# Patient Record
Sex: MALE | Race: WHITE | URBAN - METROPOLITAN AREA
[De-identification: names, ages, dates, MRNs, and addresses within clinical notes are randomized per-mention and may not be internally consistent; named-entity substitution may affect disease eponyms.]

---

## 2024-05-17 ENCOUNTER — OCCMED (OUTPATIENT)
Dept: URGENT CARE | Facility: CLINIC | Age: 59
End: 2024-05-17

## 2024-05-17 DIAGNOSIS — Z02.83 ENCOUNTER FOR DRUG SCREENING: Primary | ICD-10-CM

## 2024-08-06 ENCOUNTER — OCCMED (OUTPATIENT)
Dept: URGENT CARE | Facility: CLINIC | Age: 59
End: 2024-08-06

## 2024-08-06 DIAGNOSIS — Z02.83 ENCOUNTER FOR DRUG SCREENING: Primary | ICD-10-CM

## 2024-12-08 ENCOUNTER — APPOINTMENT (OUTPATIENT)
Dept: RADIOLOGY | Facility: CLINIC | Age: 59
End: 2024-12-08
Payer: OTHER MISCELLANEOUS

## 2024-12-08 ENCOUNTER — OCCMED (OUTPATIENT)
Dept: URGENT CARE | Facility: CLINIC | Age: 59
End: 2024-12-08
Payer: OTHER MISCELLANEOUS

## 2024-12-08 DIAGNOSIS — M25.512 ACUTE PAIN OF LEFT SHOULDER: Primary | ICD-10-CM

## 2024-12-08 DIAGNOSIS — M25.512 ACUTE PAIN OF LEFT SHOULDER: ICD-10-CM

## 2024-12-08 PROCEDURE — 99285 EMERGENCY DEPT VISIT HI MDM: CPT | Performed by: NURSE PRACTITIONER

## 2024-12-08 PROCEDURE — 73060 X-RAY EXAM OF HUMERUS: CPT

## 2024-12-08 PROCEDURE — 73030 X-RAY EXAM OF SHOULDER: CPT

## 2024-12-08 PROCEDURE — G0384 LEV 5 HOSP TYPE B ED VISIT: HCPCS | Performed by: NURSE PRACTITIONER

## 2024-12-08 RX ORDER — HYDROXYZINE HYDROCHLORIDE 10 MG/1
10 TABLET, FILM COATED ORAL
COMMUNITY
Start: 2024-11-11

## 2024-12-08 RX ORDER — ESCITALOPRAM OXALATE 10 MG/1
10 TABLET ORAL DAILY
COMMUNITY
Start: 2024-10-16

## 2024-12-11 ENCOUNTER — OCCMED (OUTPATIENT)
Dept: URGENT CARE | Facility: CLINIC | Age: 59
End: 2024-12-11
Payer: OTHER MISCELLANEOUS

## 2024-12-11 DIAGNOSIS — Z04.2 ENCOUNTER FOR EXAMINATION OR OBSERVATION FOLLOWING WORK ACCIDENT: Primary | ICD-10-CM

## 2024-12-11 PROCEDURE — 99213 OFFICE O/P EST LOW 20 MIN: CPT | Performed by: PHYSICIAN ASSISTANT

## 2024-12-13 ENCOUNTER — TELEPHONE (OUTPATIENT)
Dept: URGENT CARE | Facility: CLINIC | Age: 59
End: 2024-12-13

## 2024-12-13 DIAGNOSIS — M25.512 ACUTE PAIN OF LEFT SHOULDER: Primary | ICD-10-CM

## 2024-12-13 RX ORDER — TRAMADOL HYDROCHLORIDE 50 MG/1
50 TABLET ORAL EVERY 6 HOURS PRN
Qty: 30 TABLET | Refills: 0 | Status: SHIPPED | OUTPATIENT
Start: 2024-12-13

## 2024-12-13 NOTE — TELEPHONE ENCOUNTER
Pt called stating pharmacy wouldn't accept occ med Rx without real signature.  Requesting sent electronically to pharmacy.  Sent to [place] pharmacy, [facility]

## 2024-12-17 ENCOUNTER — APPOINTMENT (OUTPATIENT)
Age: 59
End: 2024-12-17
Payer: OTHER MISCELLANEOUS

## 2024-12-17 PROCEDURE — 99213 OFFICE O/P EST LOW 20 MIN: CPT | Performed by: PHYSICIAN ASSISTANT

## 2024-12-24 ENCOUNTER — APPOINTMENT (OUTPATIENT)
Age: 59
End: 2024-12-24
Payer: OTHER MISCELLANEOUS

## 2024-12-24 DIAGNOSIS — S46.212A TRAUMATIC PARTIAL TEAR OF LEFT BICEPS TENDON, INITIAL ENCOUNTER: ICD-10-CM

## 2024-12-24 DIAGNOSIS — S46.012A TRAUMATIC ROTATOR CUFF TEAR, LEFT, INITIAL ENCOUNTER: Primary | ICD-10-CM

## 2024-12-24 PROCEDURE — 99214 OFFICE O/P EST MOD 30 MIN: CPT | Performed by: PHYSICIAN ASSISTANT

## 2025-01-02 ENCOUNTER — OFFICE VISIT (OUTPATIENT)
Age: 60
End: 2025-01-02
Payer: OTHER MISCELLANEOUS

## 2025-01-02 ENCOUNTER — APPOINTMENT (OUTPATIENT)
Age: 60
End: 2025-01-02
Payer: COMMERCIAL

## 2025-01-02 VITALS — HEIGHT: 70 IN | BODY MASS INDEX: 35.07 KG/M2 | WEIGHT: 245 LBS

## 2025-01-02 DIAGNOSIS — S46.012A TRAUMATIC COMPLETE TEAR OF LEFT ROTATOR CUFF, INITIAL ENCOUNTER: ICD-10-CM

## 2025-01-02 DIAGNOSIS — M79.605 LEFT LEG PAIN: ICD-10-CM

## 2025-01-02 DIAGNOSIS — M25.562 ACUTE PAIN OF LEFT KNEE: ICD-10-CM

## 2025-01-02 DIAGNOSIS — M79.605 LEFT LEG PAIN: Primary | ICD-10-CM

## 2025-01-02 LAB
ALBUMIN SERPL BCG-MCNC: 4.3 G/DL (ref 3.5–5)
ALP SERPL-CCNC: 62 U/L (ref 34–104)
ALT SERPL W P-5'-P-CCNC: 15 U/L (ref 7–52)
ANION GAP SERPL CALCULATED.3IONS-SCNC: 6 MMOL/L (ref 4–13)
AST SERPL W P-5'-P-CCNC: 14 U/L (ref 13–39)
BASOPHILS # BLD AUTO: 0.04 THOUSANDS/ΜL (ref 0–0.1)
BASOPHILS NFR BLD AUTO: 1 % (ref 0–1)
BILIRUB SERPL-MCNC: 0.51 MG/DL (ref 0.2–1)
BUN SERPL-MCNC: 13 MG/DL (ref 5–25)
CALCIUM SERPL-MCNC: 9.3 MG/DL (ref 8.4–10.2)
CHLORIDE SERPL-SCNC: 104 MMOL/L (ref 96–108)
CO2 SERPL-SCNC: 28 MMOL/L (ref 21–32)
CREAT SERPL-MCNC: 0.87 MG/DL (ref 0.6–1.3)
EOSINOPHIL # BLD AUTO: 0.05 THOUSAND/ΜL (ref 0–0.61)
EOSINOPHIL NFR BLD AUTO: 1 % (ref 0–6)
ERYTHROCYTE [DISTWIDTH] IN BLOOD BY AUTOMATED COUNT: 13.8 % (ref 11.6–15.1)
GFR SERPL CREATININE-BSD FRML MDRD: 94 ML/MIN/1.73SQ M
GLUCOSE P FAST SERPL-MCNC: 73 MG/DL (ref 65–99)
HCT VFR BLD AUTO: 47.4 % (ref 36.5–49.3)
HGB BLD-MCNC: 15.3 G/DL (ref 12–17)
IMM GRANULOCYTES # BLD AUTO: 0.01 THOUSAND/UL (ref 0–0.2)
IMM GRANULOCYTES NFR BLD AUTO: 0 % (ref 0–2)
LYMPHOCYTES # BLD AUTO: 2.63 THOUSANDS/ΜL (ref 0.6–4.47)
LYMPHOCYTES NFR BLD AUTO: 36 % (ref 14–44)
MCH RBC QN AUTO: 29 PG (ref 26.8–34.3)
MCHC RBC AUTO-ENTMCNC: 32.3 G/DL (ref 31.4–37.4)
MCV RBC AUTO: 90 FL (ref 82–98)
MONOCYTES # BLD AUTO: 0.41 THOUSAND/ΜL (ref 0.17–1.22)
MONOCYTES NFR BLD AUTO: 6 % (ref 4–12)
NEUTROPHILS # BLD AUTO: 4.26 THOUSANDS/ΜL (ref 1.85–7.62)
NEUTS SEG NFR BLD AUTO: 56 % (ref 43–75)
NRBC BLD AUTO-RTO: 0 /100 WBCS
PLATELET # BLD AUTO: 247 THOUSANDS/UL (ref 149–390)
PMV BLD AUTO: 9.4 FL (ref 8.9–12.7)
POTASSIUM SERPL-SCNC: 4.1 MMOL/L (ref 3.5–5.3)
PROT SERPL-MCNC: 6.9 G/DL (ref 6.4–8.4)
RBC # BLD AUTO: 5.27 MILLION/UL (ref 3.88–5.62)
SODIUM SERPL-SCNC: 138 MMOL/L (ref 135–147)
WBC # BLD AUTO: 7.4 THOUSAND/UL (ref 4.31–10.16)

## 2025-01-02 PROCEDURE — 80053 COMPREHEN METABOLIC PANEL: CPT

## 2025-01-02 PROCEDURE — 99245 OFF/OP CONSLTJ NEW/EST HI 55: CPT | Performed by: ORTHOPAEDIC SURGERY

## 2025-01-02 PROCEDURE — 85025 COMPLETE CBC W/AUTO DIFF WBC: CPT

## 2025-01-02 PROCEDURE — 73562 X-RAY EXAM OF KNEE 3: CPT

## 2025-01-02 PROCEDURE — 36415 COLL VENOUS BLD VENIPUNCTURE: CPT

## 2025-01-02 PROCEDURE — 73590 X-RAY EXAM OF LOWER LEG: CPT

## 2025-01-02 RX ORDER — CHLORHEXIDINE GLUCONATE ORAL RINSE 1.2 MG/ML
15 SOLUTION DENTAL ONCE
OUTPATIENT
Start: 2025-01-02 | End: 2025-01-02

## 2025-01-02 NOTE — H&P (VIEW-ONLY)
CHIEF COMPLAIN/REASON FOR VISIT  Chief Complaint   Patient presents with    Left Upper Arm - Pain       HISTORY OF PRESENT ILLNESS  Jackson Aquino is a RHD 59 y.o. male who presents for evaluation of their left shoulder. This is a workers comp claim. He works as a  for [employer]. On 12/8/24, a heavy metal plate fell onto his right side which caused him to fall onto the left side of his body. This resulted in left shoulder and well as his left leg. Patient presented to the [facility] on 12/11/24 where he obtained x-rays of his left shoulder. He was prescribed traMADol 50mg tablets which he has been taking for pain which has not been providing any relief. He described difficulty with all overhead activities. He described increased pain at night which is causing difficulty sleeping.     PMHx-bilateral TKA    REVIEW OF SYSTEMS  Review of systems was performed and, woutside that mentioned in the HPI, it was negative for symptomology related to the integumentary, hematologic, immunologic, allergic, neurologic, cardiovascular, respiratory, GI or  systems.     MEDICAL HISTORY  There is no problem list on file for this patient.      SURGICAL HISTORY  History reviewed. No pertinent surgical history.    CURRENT MEDICATIONS    Current Outpatient Medications:     escitalopram (LEXAPRO) 10 mg tablet, Take 10 mg by mouth daily, Disp: , Rfl:     hydrOXYzine HCL (ATARAX) 10 mg tablet, Take 10 mg by mouth, Disp: , Rfl:     traMADol (Ultram) 50 mg tablet, Take 1 tablet (50 mg total) by mouth every 6 (six) hours as needed for moderate pain (Patient not taking: Reported on 1/2/2025), Disp: 30 tablet, Rfl: 0    SOCIAL HISTORY  Social History     Socioeconomic History    Marital status: Single     Spouse name: Not on file    Number of children: Not on file    Years of education: Not on file    Highest education level: Not on file   Occupational History    Not on file   Tobacco Use    Smoking status: Every Day     Current  "packs/day: 1.00     Types: Cigarettes     Passive exposure: Current    Smokeless tobacco: Never   Vaping Use    Vaping status: Never Used   Substance and Sexual Activity    Alcohol use: Not Currently    Drug use: Not on file    Sexual activity: Not on file   Other Topics Concern    Not on file   Social History Narrative    Not on file     Social Drivers of Health     Financial Resource Strain: Not on file   Food Insecurity: Not on file   Transportation Needs: Not on file   Physical Activity: Not on file   Stress: Not on file   Social Connections: Not on file   Intimate Partner Violence: Not on file   Housing Stability: Not on file       Objective     VITAL SIGNS  Ht 5' 10\" (1.778 m)   Wt 111 kg (245 lb)   BMI 35.15 kg/m²      PHYSICAL EXAM  General:   Well-appearing  No acute distress  Appears stated age    Cervical Spine  No tenderness to palpation over the cervical spine in the midline or of the paraspinal muscles  Full range of motion without pain  Negative spurlings   Negative Lhermitte test    Left Shoulder  Negative tenderness to palpation over the acromioclavicular joint  Negative tenderness to palpation over the long head of the biceps tendon  Shoulder effusion none present  Shoulder abduction 80 / 180  Shoulder forward flexion 80 / 180  Shoulder external rotation 50/50  Shoulder internal rotation sacrum / L1  Supraspinatus/ABD 4/5   Infraspinatus/ER 4/5   Negative Belly Press/SS  Negative Neer  Negative Ballesteros  Negative O'briens  Negative Apprehension  Negative Relocation  Negative Posterior Jerk  Negative Sulcus  Skin is intact with no erythema, warmth or drainage  Motor strength intact distally  Sensation to light touch is normal in the axillary, radial, ulnar, and median nerve distributions.  Fingers warm and perfused    RADIOGRAPHIC EXAMINATION/DIAGNOSTICS:  Procedure: MRI shoulder left wo contrast  Result Date: 1/2/2025  Narrative: " [id number]    Procedure: XR humerus left  Result Date: 12/8/2024  Narrative: XR HUMERUS LEFT INDICATION: M25.512: Pain in left shoulder. COMPARISON: None FINDINGS: No acute fracture or dislocation. No significant degenerative changes. No lytic or blastic osseous lesion. Unremarkable soft tissues.     Impression: No acute osseous abnormality. Computerized Assisted Algorithm (CAA) may have been used to analyze all applicable images. Workstation performed: [id number]     Procedure: XR shoulder 2+ vw left  Result Date: 12/8/2024  Narrative: XR SHOULDER 2+ VW LEFT INDICATION: M25.512: Pain in left shoulder. COMPARISON: None FINDINGS: No acute fracture or dislocation. Mild acromioclavicular degenerative changes. No lytic or blastic osseous lesion. Unremarkable soft tissues.     Impression: No acute osseous abnormality. Computerized Assisted Algorithm (CAA) may have been used to analyze all applicable images. Workstation performed: [id number]     Independent interpretation of the left shoulder MRI from 12/28/24 demonstrate full thickness tear of the supraspinatus. Longitudinal intra-substance tear of the intra-articular biceps tendon. Down sloping of the left acromion  Independent interpretation of the left tib/fib x-rays with the patients that demonstrate no acute fracture or osseus deformities. No loosening of TKA.        ASSESSMENT/PLAN:  Left shoulder acute traumatic tear of the left rotator cuff  Discussed that the patient would be a good candidate for rotator cuff repair. Patient will obtain pre-operative clearances   Discussed x-rays of the left knee and tib/fib x-rays with the patients that demonstrate no acute fracture or osseus deformities. No loosening of TKA.        Jackson Aquino injured their shoulder acutely, resulting in a traumatic rotator cuff tear. We discussed the importance of the rotator cuff in the stability and strength of the shoulder and other  overhead activities. We had an extensive discussion regarding the diagnosis and its natural history. We also discussed both non-operative and operative treatment strategies. In an active individual who is frequently using the upper extremity for work and other activities, I would recommend rotator cuff repair. he as continued to experience significant symptoms and dysfunction and is ready to proceed with surgery. I certainly understand and am in agreement.    Pending medical clearance, we will plan to proceed with shoulder arthroscopy with rotator cuff debridement versus repair, possible biceps tenodesis, subacromial decompression,  and all other indicated procedures. We described the significant post-operative recovery, including a minimum of 6 to 9 months return to full strenuous upper extremity activity if everything goes well. We discussed the anticipated pre, intra, and postoperative course in great detail. Specifically, we discussed the recovery and rehabilitation protocol and time lines.There is certainly a risk of reinjury upon returning full strenuous activities.    We discussed risks of surgery, which include shoulder stiffness, infection, risk of reinjury and future arthritis.    Patient will schedule rotator cuff repair surgery. he will need a preoperative clearance/physical appointment and physical therapy.            Scribe Attestation      I,:  Haresh Velasquez am acting as a scribe while in the presence of the attending physician.:       I,:  Ernesto Limon MD personally performed the services described in this documentation    as scribed in my presence.:

## 2025-01-02 NOTE — PROGRESS NOTES
CHIEF COMPLAIN/REASON FOR VISIT  Chief Complaint   Patient presents with    Left Upper Arm - Pain       HISTORY OF PRESENT ILLNESS  Jackson Aquino is a RHD 59 y.o. male who presents for evaluation of their left shoulder. This is a workers comp claim. He works as a  for [employer]. On 12/8/24, a heavy metal plate fell onto his right side which caused him to fall onto the left side of his body. This resulted in left shoulder and well as his left leg. Patient presented to the [facility] on 12/11/24 where he obtained x-rays of his left shoulder. He was prescribed traMADol 50mg tablets which he has been taking for pain which has not been providing any relief. He described difficulty with all overhead activities. He described increased pain at night which is causing difficulty sleeping.     PMHx-bilateral TKA    REVIEW OF SYSTEMS  Review of systems was performed and, woutside that mentioned in the HPI, it was negative for symptomology related to the integumentary, hematologic, immunologic, allergic, neurologic, cardiovascular, respiratory, GI or  systems.     MEDICAL HISTORY  There is no problem list on file for this patient.      SURGICAL HISTORY  History reviewed. No pertinent surgical history.    CURRENT MEDICATIONS    Current Outpatient Medications:     escitalopram (LEXAPRO) 10 mg tablet, Take 10 mg by mouth daily, Disp: , Rfl:     hydrOXYzine HCL (ATARAX) 10 mg tablet, Take 10 mg by mouth, Disp: , Rfl:     traMADol (Ultram) 50 mg tablet, Take 1 tablet (50 mg total) by mouth every 6 (six) hours as needed for moderate pain (Patient not taking: Reported on 1/2/2025), Disp: 30 tablet, Rfl: 0    SOCIAL HISTORY  Social History     Socioeconomic History    Marital status: Single     Spouse name: Not on file    Number of children: Not on file    Years of education: Not on file    Highest education level: Not on file   Occupational History    Not on file   Tobacco Use    Smoking status: Every Day     Current  "packs/day: 1.00     Types: Cigarettes     Passive exposure: Current    Smokeless tobacco: Never   Vaping Use    Vaping status: Never Used   Substance and Sexual Activity    Alcohol use: Not Currently    Drug use: Not on file    Sexual activity: Not on file   Other Topics Concern    Not on file   Social History Narrative    Not on file     Social Drivers of Health     Financial Resource Strain: Not on file   Food Insecurity: Not on file   Transportation Needs: Not on file   Physical Activity: Not on file   Stress: Not on file   Social Connections: Not on file   Intimate Partner Violence: Not on file   Housing Stability: Not on file       Objective     VITAL SIGNS  Ht 5' 10\" (1.778 m)   Wt 111 kg (245 lb)   BMI 35.15 kg/m²      PHYSICAL EXAM  General:   Well-appearing  No acute distress  Appears stated age    Cervical Spine  No tenderness to palpation over the cervical spine in the midline or of the paraspinal muscles  Full range of motion without pain  Negative spurlings   Negative Lhermitte test    Left Shoulder  Negative tenderness to palpation over the acromioclavicular joint  Negative tenderness to palpation over the long head of the biceps tendon  Shoulder effusion none present  Shoulder abduction 80 / 180  Shoulder forward flexion 80 / 180  Shoulder external rotation 50/50  Shoulder internal rotation sacrum / L1  Supraspinatus/ABD 4/5   Infraspinatus/ER 4/5   Negative Belly Press/SS  Negative Neer  Negative Ballesteros  Negative O'briens  Negative Apprehension  Negative Relocation  Negative Posterior Jerk  Negative Sulcus  Skin is intact with no erythema, warmth or drainage  Motor strength intact distally  Sensation to light touch is normal in the axillary, radial, ulnar, and median nerve distributions.  Fingers warm and perfused    RADIOGRAPHIC EXAMINATION/DIAGNOSTICS:  Procedure: MRI shoulder left wo contrast  Result Date: 1/2/2025  Narrative: " [id number]    Procedure: XR humerus left  Result Date: 12/8/2024  Narrative: XR HUMERUS LEFT INDICATION: M25.512: Pain in left shoulder. COMPARISON: None FINDINGS: No acute fracture or dislocation. No significant degenerative changes. No lytic or blastic osseous lesion. Unremarkable soft tissues.     Impression: No acute osseous abnormality. Computerized Assisted Algorithm (CAA) may have been used to analyze all applicable images. Workstation performed: [id number]     Procedure: XR shoulder 2+ vw left  Result Date: 12/8/2024  Narrative: XR SHOULDER 2+ VW LEFT INDICATION: M25.512: Pain in left shoulder. COMPARISON: None FINDINGS: No acute fracture or dislocation. Mild acromioclavicular degenerative changes. No lytic or blastic osseous lesion. Unremarkable soft tissues.     Impression: No acute osseous abnormality. Computerized Assisted Algorithm (CAA) may have been used to analyze all applicable images. Workstation performed: [id number]     Independent interpretation of the left shoulder MRI from 12/28/24 demonstrate full thickness tear of the supraspinatus. Longitudinal intra-substance tear of the intra-articular biceps tendon. Down sloping of the left acromion  Independent interpretation of the left tib/fib x-rays with the patients that demonstrate no acute fracture or osseus deformities. No loosening of TKA.        ASSESSMENT/PLAN:  Left shoulder acute traumatic tear of the left rotator cuff  Discussed that the patient would be a good candidate for rotator cuff repair. Patient will obtain pre-operative clearances   Discussed x-rays of the left knee and tib/fib x-rays with the patients that demonstrate no acute fracture or osseus deformities. No loosening of TKA.        Jackson Aquino injured their shoulder acutely, resulting in a traumatic rotator cuff tear. We discussed the importance of the rotator cuff in the stability and strength of the shoulder and other  overhead activities. We had an extensive discussion regarding the diagnosis and its natural history. We also discussed both non-operative and operative treatment strategies. In an active individual who is frequently using the upper extremity for work and other activities, I would recommend rotator cuff repair. he as continued to experience significant symptoms and dysfunction and is ready to proceed with surgery. I certainly understand and am in agreement.    Pending medical clearance, we will plan to proceed with shoulder arthroscopy with rotator cuff debridement versus repair, possible biceps tenodesis, subacromial decompression,  and all other indicated procedures. We described the significant post-operative recovery, including a minimum of 6 to 9 months return to full strenuous upper extremity activity if everything goes well. We discussed the anticipated pre, intra, and postoperative course in great detail. Specifically, we discussed the recovery and rehabilitation protocol and time lines.There is certainly a risk of reinjury upon returning full strenuous activities.    We discussed risks of surgery, which include shoulder stiffness, infection, risk of reinjury and future arthritis.    Patient will schedule rotator cuff repair surgery. he will need a preoperative clearance/physical appointment and physical therapy.            Scribe Attestation      I,:  Haresh Velasquez am acting as a scribe while in the presence of the attending physician.:       I,:  Ernesto Limon MD personally performed the services described in this documentation    as scribed in my presence.:

## 2025-01-03 LAB
ATRIAL RATE: 57 BPM
P AXIS: 7 DEGREES
PR INTERVAL: 156 MS
QRS AXIS: 2 DEGREES
QRSD INTERVAL: 84 MS
QT INTERVAL: 442 MS
QTC INTERVAL: 431 MS
T WAVE AXIS: 25 DEGREES
VENTRICULAR RATE: 57 BPM

## 2025-01-03 PROCEDURE — 93010 ELECTROCARDIOGRAM REPORT: CPT

## 2025-01-08 ENCOUNTER — ANESTHESIA EVENT (OUTPATIENT)
Age: 60
End: 2025-01-08
Payer: OTHER MISCELLANEOUS

## 2025-01-08 ENCOUNTER — TELEPHONE (OUTPATIENT)
Dept: OBGYN CLINIC | Facility: MEDICAL CENTER | Age: 60
End: 2025-01-08

## 2025-01-08 NOTE — TELEPHONE ENCOUNTER
Received a call from patient and returned his call.  He wants to know if he can get a prescription to quit smoking,  Also is concerned about the affects of anesthesia as sometimes he does get sick from it and wants to prevent that if possible as well as he wants to know since his surgery potentially is later in the day (but he knows it is subject to change) if he could get the pain medication before the surgery to avoid going to the pharmacy late in the day since he does live in [place]?

## 2025-01-10 DIAGNOSIS — M75.102 TEAR OF LEFT ROTATOR CUFF, UNSPECIFIED TEAR EXTENT, UNSPECIFIED WHETHER TRAUMATIC: Primary | ICD-10-CM

## 2025-01-10 RX ORDER — NAPROXEN 500 MG/1
500 TABLET ORAL 2 TIMES DAILY WITH MEALS
Qty: 60 TABLET | Refills: 1 | Status: SHIPPED | OUTPATIENT
Start: 2025-01-10

## 2025-01-10 RX ORDER — OXYCODONE HYDROCHLORIDE 5 MG/1
5 TABLET ORAL EVERY 4 HOURS PRN
Qty: 15 TABLET | Refills: 0 | Status: SHIPPED | OUTPATIENT
Start: 2025-01-10

## 2025-01-10 RX ORDER — ACETAMINOPHEN 325 MG/1
325 TABLET ORAL EVERY 6 HOURS PRN
Qty: 30 TABLET | Refills: 1 | Status: SHIPPED | OUTPATIENT
Start: 2025-01-10

## 2025-01-10 NOTE — PROGRESS NOTES
Preoperative medications for upcoming rotator cuff surgery sent to the pharmacy per patient request

## 2025-01-10 NOTE — TELEPHONE ENCOUNTER
Spoke to patient and relayed message to speak to his PCP about quiting smoking and medication options and told him that a script was sent in for medication for pain for after the surgery as well.  He asked about the Narcan and I suggested he speak to the pharmacist about that medication

## 2025-01-13 NOTE — PRE-PROCEDURE INSTRUCTIONS
Pre-Surgery Instructions:   Medication Instructions    escitalopram (LEXAPRO) 10 mg tablet Take day of surgery.    hydrOXYzine HCL (ATARAX) 10 mg tablet Uses PRN- OK to take day of surgery    Medication instructions for day surgery reviewed. Please use only a sip of water to take your instructed medications. Avoid all over the counter vitamins, supplements and NSAIDS for one week prior to surgery per anesthesia guidelines. Tylenol is ok to take as needed.     You will receive a call one business day prior to surgery with an arrival time and hospital directions. If your surgery is scheduled on a Monday, the hospital will be calling you on the Friday prior to your surgery. If you have not heard from anyone by 8pm, please call the hospital supervisor through the hospital  at [phone]. ([place] [phone] or [place] [phone]).    Do not eat or drink anything after midnight the night before your surgery, including candy, mints, lifesavers, or chewing gum. Do not drink alcohol 24hrs before your surgery. Try not to smoke at least 24hrs before your surgery.       Follow the pre surgery showering instructions as listed in the “My Surgical Experience Booklet” or otherwise provided by your surgeon's office. Do not use a blade to shave the surgical area 1 week before surgery. It is okay to use a clean electric clippers up to 24 hours before surgery. Do not apply any lotions, creams, including makeup, cologne, deodorant, or perfumes after showering on the day of your surgery. Do not use dry shampoo, hair spray, hair gel, or any type of hair products.     No contact lenses, eye make-up, or artificial eyelashes. Remove nail polish, including gel polish, and any artificial, gel, or acrylic nails if possible. Remove all jewelry including rings and body piercing jewelry.     Wear causal clothing that is easy to take on and off. Consider your type of surgery.    Keep any valuables, jewelry, piercings at home.  Please bring any specially ordered equipment (sling, braces) if indicated.    Arrange for a responsible person to drive you to and from the hospital on the day of your surgery. Please confirm the visitor policy for the day of your procedure when you receive your phone call with an arrival time.     Call the surgeon's office with any new illnesses, exposures, or additional questions prior to surgery.    Please reference your “My Surgical Experience Booklet” for additional information to prepare for your upcoming surgery.

## 2025-01-15 ENCOUNTER — HOSPITAL ENCOUNTER (OUTPATIENT)
Age: 60
Setting detail: OUTPATIENT SURGERY
Discharge: HOME | End: 2025-01-15
Attending: ORTHOPAEDIC SURGERY | Admitting: ORTHOPAEDIC SURGERY
Payer: OTHER MISCELLANEOUS

## 2025-01-15 ENCOUNTER — ANESTHESIA (OUTPATIENT)
Age: 60
End: 2025-01-15
Payer: OTHER MISCELLANEOUS

## 2025-01-15 VITALS
WEIGHT: 243.4 LBS | DIASTOLIC BLOOD PRESSURE: 66 MMHG | OXYGEN SATURATION: 96 % | TEMPERATURE: 97.2 F | HEIGHT: 70 IN | SYSTOLIC BLOOD PRESSURE: 114 MMHG | HEART RATE: 62 BPM | RESPIRATION RATE: 12 BRPM | BODY MASS INDEX: 34.84 KG/M2

## 2025-01-15 PROBLEM — S46.012A TRAUMATIC COMPLETE TEAR OF LEFT ROTATOR CUFF: Status: ACTIVE | Noted: 2025-01-15

## 2025-01-15 PROBLEM — F17.200 SMOKER: Status: ACTIVE | Noted: 2025-01-15

## 2025-01-15 LAB — GLUCOSE SERPL-MCNC: 90 MG/DL (ref 65–140)

## 2025-01-15 PROCEDURE — 29828 SHO ARTHRS SRG BICP TENODSIS: CPT | Performed by: ORTHOPAEDIC SURGERY

## 2025-01-15 PROCEDURE — 29827 SHO ARTHRS SRG RT8TR CUF RPR: CPT | Performed by: ORTHOPAEDIC SURGERY

## 2025-01-15 PROCEDURE — 29826 SHO ARTHRS SRG DECOMPRESSION: CPT

## 2025-01-15 PROCEDURE — C1713 ANCHOR/SCREW BN/BN,TIS/BN: HCPCS | Performed by: ORTHOPAEDIC SURGERY

## 2025-01-15 PROCEDURE — 29827 SHO ARTHRS SRG RT8TR CUF RPR: CPT

## 2025-01-15 PROCEDURE — 29826 SHO ARTHRS SRG DECOMPRESSION: CPT | Performed by: ORTHOPAEDIC SURGERY

## 2025-01-15 PROCEDURE — 29828 SHO ARTHRS SRG BICP TENODSIS: CPT

## 2025-01-15 PROCEDURE — 82948 REAGENT STRIP/BLOOD GLUCOSE: CPT

## 2025-01-15 DEVICE — 4.75MM BC KNOTLESS SWIVELOCK
Type: IMPLANTABLE DEVICE | Site: SHOULDER | Status: FUNCTIONAL
Brand: ARTHREX®

## 2025-01-15 DEVICE — SP FBRTAK RC FBRTPE BLU & STTPE WH/BLK
Type: IMPLANTABLE DEVICE | Site: SHOULDER | Status: FUNCTIONAL
Brand: ARTHREX®

## 2025-01-15 DEVICE — SP FBRTAK RC FBRTPE BLK/BLU & STTPE BLU
Type: IMPLANTABLE DEVICE | Site: SHOULDER | Status: FUNCTIONAL
Brand: ARTHREX®

## 2025-01-15 RX ORDER — TRANEXAMIC ACID 10 MG/ML
INJECTION, SOLUTION INTRAVENOUS AS NEEDED
Status: DISCONTINUED | OUTPATIENT
Start: 2025-01-15 | End: 2025-01-15

## 2025-01-15 RX ORDER — ROCURONIUM BROMIDE 10 MG/ML
INJECTION, SOLUTION INTRAVENOUS AS NEEDED
Status: DISCONTINUED | OUTPATIENT
Start: 2025-01-15 | End: 2025-01-15

## 2025-01-15 RX ORDER — HYDROMORPHONE HCL IN WATER/PF 6 MG/30 ML
0.2 PATIENT CONTROLLED ANALGESIA SYRINGE INTRAVENOUS
Status: DISCONTINUED | OUTPATIENT
Start: 2025-01-15 | End: 2025-01-15 | Stop reason: HOSPADM

## 2025-01-15 RX ORDER — IPRATROPIUM BROMIDE AND ALBUTEROL SULFATE 2.5; .5 MG/3ML; MG/3ML
3 SOLUTION RESPIRATORY (INHALATION) ONCE AS NEEDED
Status: DISCONTINUED | OUTPATIENT
Start: 2025-01-15 | End: 2025-01-15 | Stop reason: HOSPADM

## 2025-01-15 RX ORDER — LIDOCAINE HYDROCHLORIDE 20 MG/ML
INJECTION, SOLUTION EPIDURAL; INFILTRATION; INTRACAUDAL; PERINEURAL AS NEEDED
Status: DISCONTINUED | OUTPATIENT
Start: 2025-01-15 | End: 2025-01-15

## 2025-01-15 RX ORDER — DEXAMETHASONE SODIUM PHOSPHATE 10 MG/ML
INJECTION, SOLUTION INTRAMUSCULAR; INTRAVENOUS AS NEEDED
Status: DISCONTINUED | OUTPATIENT
Start: 2025-01-15 | End: 2025-01-15

## 2025-01-15 RX ORDER — ONDANSETRON 2 MG/ML
INJECTION INTRAMUSCULAR; INTRAVENOUS AS NEEDED
Status: DISCONTINUED | OUTPATIENT
Start: 2025-01-15 | End: 2025-01-15

## 2025-01-15 RX ORDER — SCOPOLAMINE 1 MG/3D
1 PATCH, EXTENDED RELEASE TRANSDERMAL
Status: DISCONTINUED | OUTPATIENT
Start: 2025-01-15 | End: 2025-01-15 | Stop reason: HOSPADM

## 2025-01-15 RX ORDER — EPHEDRINE SULFATE 50 MG/ML
INJECTION INTRAVENOUS AS NEEDED
Status: DISCONTINUED | OUTPATIENT
Start: 2025-01-15 | End: 2025-01-15

## 2025-01-15 RX ORDER — SODIUM CHLORIDE, SODIUM LACTATE, POTASSIUM CHLORIDE, CALCIUM CHLORIDE 600; 310; 30; 20 MG/100ML; MG/100ML; MG/100ML; MG/100ML
125 INJECTION, SOLUTION INTRAVENOUS CONTINUOUS
Status: DISCONTINUED | OUTPATIENT
Start: 2025-01-15 | End: 2025-01-15 | Stop reason: HOSPADM

## 2025-01-15 RX ORDER — CHLORHEXIDINE GLUCONATE ORAL RINSE 1.2 MG/ML
15 SOLUTION DENTAL ONCE
Status: COMPLETED | OUTPATIENT
Start: 2025-01-15 | End: 2025-01-15

## 2025-01-15 RX ORDER — LIDOCAINE HYDROCHLORIDE 10 MG/ML
0.5 INJECTION, SOLUTION EPIDURAL; INFILTRATION; INTRACAUDAL; PERINEURAL ONCE AS NEEDED
Status: DISCONTINUED | OUTPATIENT
Start: 2025-01-15 | End: 2025-01-15 | Stop reason: HOSPADM

## 2025-01-15 RX ORDER — OXYCODONE HYDROCHLORIDE 5 MG/1
5 TABLET ORAL EVERY 4 HOURS PRN
Refills: 0 | Status: DISCONTINUED | OUTPATIENT
Start: 2025-01-15 | End: 2025-01-15 | Stop reason: HOSPADM

## 2025-01-15 RX ORDER — PROPOFOL 10 MG/ML
INJECTION, EMULSION INTRAVENOUS AS NEEDED
Status: DISCONTINUED | OUTPATIENT
Start: 2025-01-15 | End: 2025-01-15

## 2025-01-15 RX ORDER — FENTANYL CITRATE/PF 50 MCG/ML
50 SYRINGE (ML) INJECTION
Status: DISCONTINUED | OUTPATIENT
Start: 2025-01-15 | End: 2025-01-15 | Stop reason: HOSPADM

## 2025-01-15 RX ORDER — CEFAZOLIN SODIUM 2 G/50ML
2000 SOLUTION INTRAVENOUS ONCE
Status: COMPLETED | OUTPATIENT
Start: 2025-01-15 | End: 2025-01-15

## 2025-01-15 RX ORDER — MIDAZOLAM HYDROCHLORIDE 2 MG/2ML
INJECTION, SOLUTION INTRAMUSCULAR; INTRAVENOUS
Status: COMPLETED | OUTPATIENT
Start: 2025-01-15 | End: 2025-01-15

## 2025-01-15 RX ORDER — ONDANSETRON 2 MG/ML
4 INJECTION INTRAMUSCULAR; INTRAVENOUS ONCE AS NEEDED
Status: DISCONTINUED | OUTPATIENT
Start: 2025-01-15 | End: 2025-01-15 | Stop reason: HOSPADM

## 2025-01-15 RX ORDER — DIPHENHYDRAMINE HYDROCHLORIDE 50 MG/ML
12.5 INJECTION INTRAMUSCULAR; INTRAVENOUS ONCE AS NEEDED
Status: DISCONTINUED | OUTPATIENT
Start: 2025-01-15 | End: 2025-01-15 | Stop reason: HOSPADM

## 2025-01-15 RX ORDER — HYDROMORPHONE HCL/PF 1 MG/ML
0.5 SYRINGE (ML) INJECTION
Status: DISCONTINUED | OUTPATIENT
Start: 2025-01-15 | End: 2025-01-15 | Stop reason: HOSPADM

## 2025-01-15 RX ORDER — METOCLOPRAMIDE HYDROCHLORIDE 5 MG/ML
10 INJECTION INTRAMUSCULAR; INTRAVENOUS ONCE AS NEEDED
Status: DISCONTINUED | OUTPATIENT
Start: 2025-01-15 | End: 2025-01-15 | Stop reason: HOSPADM

## 2025-01-15 RX ORDER — BUPIVACAINE HYDROCHLORIDE 5 MG/ML
INJECTION, SOLUTION EPIDURAL; INTRACAUDAL
Status: COMPLETED | OUTPATIENT
Start: 2025-01-15 | End: 2025-01-15

## 2025-01-15 RX ADMIN — PROPOFOL 60 MCG/KG/MIN: 10 INJECTION, EMULSION INTRAVENOUS at 10:39

## 2025-01-15 RX ADMIN — ROCURONIUM BROMIDE 10 MG: 10 INJECTION, SOLUTION INTRAVENOUS at 11:14

## 2025-01-15 RX ADMIN — ONDANSETRON 4 MG: 2 INJECTION INTRAMUSCULAR; INTRAVENOUS at 10:31

## 2025-01-15 RX ADMIN — SODIUM CHLORIDE, SODIUM LACTATE, POTASSIUM CHLORIDE, AND CALCIUM CHLORIDE: .6; .31; .03; .02 INJECTION, SOLUTION INTRAVENOUS at 11:36

## 2025-01-15 RX ADMIN — CEFAZOLIN SODIUM 2000 MG: 2 SOLUTION INTRAVENOUS at 10:46

## 2025-01-15 RX ADMIN — DEXAMETHASONE SODIUM PHOSPHATE 10 MG: 10 INJECTION, SOLUTION INTRAMUSCULAR; INTRAVENOUS at 10:30

## 2025-01-15 RX ADMIN — CHLORHEXIDINE GLUCONATE 15 ML: 1.2 SOLUTION ORAL at 08:00

## 2025-01-15 RX ADMIN — MIDAZOLAM 2 MG: 1 INJECTION INTRAMUSCULAR; INTRAVENOUS at 09:16

## 2025-01-15 RX ADMIN — TRANEXAMIC ACID 1000 MG: 10 INJECTION, SOLUTION INTRAVENOUS at 11:11

## 2025-01-15 RX ADMIN — SUGAMMADEX 200 MG: 100 INJECTION, SOLUTION INTRAVENOUS at 11:47

## 2025-01-15 RX ADMIN — LIDOCAINE HYDROCHLORIDE 100 MG: 20 INJECTION, SOLUTION EPIDURAL; INFILTRATION; INTRACAUDAL; PERINEURAL at 10:30

## 2025-01-15 RX ADMIN — EPHEDRINE SULFATE 5 MG: 50 INJECTION, SOLUTION INTRAVENOUS at 10:58

## 2025-01-15 RX ADMIN — BUPIVACAINE HYDROCHLORIDE 10 ML: 5 INJECTION, SOLUTION EPIDURAL; INTRACAUDAL; PERINEURAL at 09:18

## 2025-01-15 RX ADMIN — SODIUM CHLORIDE, SODIUM LACTATE, POTASSIUM CHLORIDE, AND CALCIUM CHLORIDE 125 ML/HR: .6; .31; .03; .02 INJECTION, SOLUTION INTRAVENOUS at 08:04

## 2025-01-15 RX ADMIN — PROPOFOL 150 MG: 10 INJECTION, EMULSION INTRAVENOUS at 10:30

## 2025-01-15 RX ADMIN — SCOPOLAMINE 1 PATCH: 1.5 PATCH, EXTENDED RELEASE TRANSDERMAL at 09:00

## 2025-01-15 RX ADMIN — BUPIVACAINE 20 ML: 13.3 INJECTION, SUSPENSION, LIPOSOMAL INFILTRATION at 09:18

## 2025-01-15 RX ADMIN — ROCURONIUM BROMIDE 50 MG: 10 INJECTION, SOLUTION INTRAVENOUS at 10:30

## 2025-01-15 NOTE — ANESTHESIA PREPROCEDURE EVALUATION
Procedure:  Arthroscopic repair of rotator cuff, possible subacromial decompression, possible biceps tenodesis, plus all other indicated procedures (Left: Shoulder)    Relevant Problems   ANESTHESIA (within normal limits)      CARDIO (within normal limits)      ENDO (within normal limits)      GI/HEPATIC (within normal limits)      /RENAL (within normal limits)      HEMATOLOGY (within normal limits)      MUSCULOSKELETAL (within normal limits)      NEURO/PSYCH (within normal limits)      PULMONARY   (+) Smoker      Rheumatology   (+) Traumatic complete tear of left rotator cuff        Physical Exam    Airway    Mallampati score: II  TM Distance: >3 FB  Neck ROM: full     Dental   No notable dental hx     Cardiovascular  Rhythm: regular, Rate: normal, Cardiovascular exam normal    Pulmonary  Pulmonary exam normal Breath sounds clear to auscultation    Other Findings        Anesthesia Plan  ASA Score- 2     Anesthesia Type- general with ASA Monitors.         Additional Monitors:     Airway Plan: ETT.    Comment: Left interscalene block for postoperative pain control.       Plan Factors-Exercise tolerance (METS): >4 METS.    Chart reviewed. EKG reviewed. Imaging results reviewed. Existing labs reviewed. Patient summary reviewed.          Obstructive sleep apnea risk education given perioperatively.        Induction- intravenous.    Postoperative Plan- Plan for postoperative opioid use.     Perioperative Resuscitation Plan - Level 1 - Full Code.       Informed Consent- Anesthetic plan and risks discussed with patient.  I personally reviewed this patient with the CRNA. Discussed and agreed on the Anesthesia Plan with the CRNA..      NPO Status:  Vitals Value Taken Time   Date of last liquid 01/14/25 01/15/25 0751   Time of last liquid 1830 01/15/25 0751   Date of last solid 01/14/25 01/15/25 0751   Time of last solid 1930 01/15/25 0751       Recent labs personally reviewed:  Lab Results   Component Value Date    WBC  7.40 01/02/2025    HGB 15.3 01/02/2025     01/02/2025     Lab Results   Component Value Date    K 4.1 01/02/2025    BUN 13 01/02/2025    CREATININE 0.87 01/02/2025         I, Parish Mcrae MD, have personally seen and evaluated the patient prior to anesthetic care.  I have reviewed the pre-anesthetic record, and other medical records if appropriate to the anesthetic care.  If a CRNA is involved in the case, I have reviewed the CRNA assessment, if present, and agree. Risks/benefits and alternatives discussed with patient including possible PONV, sore throat, and possibility of rare anesthetic and surgical emergencies.

## 2025-01-15 NOTE — ANESTHESIA POSTPROCEDURE EVALUATION
Post-Op Assessment Note    CV Status:  Stable  Pain Score: 0    Pain management: adequate       Mental Status:  Alert and awake   Hydration Status:  Euvolemic   PONV Controlled:  Controlled   Airway Patency:  Patent  Two or more mitigation strategies used for obstructive sleep apnea   Post Op Vitals Reviewed: Yes    No anethesia notable event occurred.    Staff: Anesthesiologist, with CRNAs           Last Filed PACU Vitals:  Vitals Value Taken Time   Temp 97.9 °F (36.6 °C) 01/15/25 1230   Pulse 65 01/15/25 1233   /62 01/15/25 1230   Resp 20 01/15/25 1233   SpO2 94 % 01/15/25 1233   Vitals shown include unfiled device data.    Modified Rafael:     Vitals Value Taken Time   Activity 2 01/15/25 1230   Respiration 2 01/15/25 1230   Circulation 2 01/15/25 1230   Consciousness 2 01/15/25 1230   Oxygen Saturation 2 01/15/25 1230     Modified Rafael Score: 10

## 2025-01-15 NOTE — OP NOTE
OPERATIVE REPORT  PATIENT NAME: Jackson Aquino    :  1965  MRN: [id number]  Pt Location: [facility] OR ROOM 05    SURGERY DATE: 1/15/2025    Surgeons and Role:     * Ernesto Limon MD - Primary     * DENIS Guillen-C - Assisting    Preop Diagnosis:  Traumatic complete tear of left rotator cuff, initial encounter [S46.012A]    Post-Op Diagnosis Codes:     * Traumatic complete tear of left rotator cuff, initial encounter [S46.012A]    Procedure(s):  Left Shoulder arthroscopic rotator cuff repair of the supraspinatus (CPT 60950)  Left Shoulder arthroscopic subacromial decompression, acromioplasty (CPT 04565)  Left Biceps tenodesis (CPT 22124)  Specimen(s):  * No specimens in log *    Estimated Blood Loss:   20 mL    Drains:  * No LDAs found *    Anesthesia Type:   General w/ Regional    Operative Indications:  Traumatic complete tear of left rotator cuff, initial encounter [S46.012A]      Operative Findings:  Massive complete tear of the supraspinatus and infraspinatus tendon  Severe Biceps tendinopathy  Impingement syndrome of the subacromial space      Complications:   None    Procedure and Technique:  Operative Findings:  Complete tear of the supraspinatus tendon  Bicipital tendinitis/tearing of the biceps tendon  Type II acromial spur with impingement syndrome  Severe degenerative joint disease of the acromioclavicular joint/distal clavicle        Procedure and Technique:  NAME OF OPERATION:  Left Shoulder arthroscopic rotator cuff repair of the supraspinatus (CPT 67091)  Left Shoulder arthroscopic subacromial decompression, acromioplasty (CPT 04932)  Left Biceps tenodesis (CPT 41244)       ASSISTANT: I requested DENIS Thompson to assist with this procedure. Assistance was medically necessary in order to safely perform the procedure without increased blood loss or morbidity. Assistance was provided through positioning, instrumentation, wound closure, and instillation of anesthetic, application of  sterile dressing, and safe transport from the operative suite.     There was no qualified resident available to assist     INDICATIONS:  Jackson Aquino is a 59 y.o. who had injured his shoulder, followed by pain and dysfunction.  he had failed conservative measures and was therefore indicated for surgical repair.  he understood the risks, benefits and alternatives to the procedure, and elected to proceed.     The patient was identified in the preoperative holding area and the correct operative site was signed. The patient was then brought into the Operating Room and Anesthesia then successfully introduced a regional block without complications. The patient was then positioned in a beach chair position. The operative shoulder and upper extremity were prepped and draped in the usual sterile fashion. Prior to making incision, a time-out occurred at which time all members of the surgical team confirmed the patient identity, laterality and correct operation against the informed written consent. It was also confirmed that the patient received preoperative antibiotics.     A posterior portal was established. The arthroscope was inserted into the glenohumeral joint. An anterior portal was established in the rotator interval. Diagnostic arthroscopy then took place. The articular surfaces of the glenoid and humeral head were within normal limits. The labrum circumferentially had some mild fraying that was gently debrided with an arthroscopic shaver. The labral attachment to the bone was solid, therefore, no labral repair was warranted.  The long head of the biceps torn and demonstrated severe tendinosis.  Using a loop and tack technique, the biceps was released at the bicipital anchor and with a fiber link suture was then set aside for later incorporation into the rotator cuff anterior ligament repair.  The supraspinatus tendon had a massive full thickness tear with moderate retraction, but good quality tissue. The  subscapularis and teres minor tendons were within normal limits.     The arthroscope was inserted into the subacromial space. There was an impingement lesion of the CA ligament. The CA ligament was removed, revealing a Type II acromial spur. A 5.5 shaver was used to rajan this down such that it was flush with the remainder of the acromion.  Dissection was carried to the acromioclavicular joint, which demonstrated severe degenerative joint disease.  An extensive bursectomy then took place; the bursa was hyperemic as was the rotator cuff. This revealed a massive 4 cm x 4 cm tear in the supraspinatus tendon.  2 anchors were placed medially and passed through the anterior and posterior limbs of the torn rotator cuff.  These were then incorporated into the biceps suture and brought laterally into the lateral greater tuberosity, nicely restoring the normal anatomy of the rotator cuff to its anatomic footprint.     At this point, all arthroscopic instruments were removed. The portal sites were closed with 3-0 nylon. Dry sterile dressing was applied. Patient was placed in a shoulder immobilizer and brought to the recovery room in stable condition.     At the conclusion of the case, the patient tolerated the procedure well. No complications. All sponge and instrument counts were correct.           Post-Operative Rehabilitation Guidelines for Standard Rotator Cuff Tears     1-4 Weeks:   Sling Immobilization  Active ROM Elbow, Wrist and Hand  True Passive (ONLY) ROM Shoulder. NO ACTIVE MOTION.   Pendulums, Supine Elevation in Scapular plane, External Rotation to tolerance with arm at side. (emphasize ER, minimum goal 40°)  Scapular Stabilization exercises (sidelying)  Deltoid isometrics in neutral (submaximal) as ROM improves  No Pulley/Canes until 6 weeks post-op (these are active motions)     4-6 Weeks:   Discontinue sling use.  Begin Active Assist ROM and advance to Active as Tolerated  Elevation in scapular plane and  external rotation as tolerated  No Internal rotation or behind back until 6wks.  Begin Cuff Isometrics at 6wks with arm at the side     6-12 Weeks:   Active Assist to Active ROM Shoulder As Tolerated  Elevation in scapular plane and external rotation to tolerance  Begin internal rotation as tolerated  Light stretching at end ranges  Cuff Isometrics with the arm at the side     3-12 Months:  Advance to full ROM as tolerated with passive stretching at end ranges  Advance strengthening as tolerated: isometrics ? bands ? light weights (1-5   lbs); 8-12 reps/2-3 sets per rotator cuff, deltoid, and scapular stabilizers  Limit strengthening 3x/week to avoid rotator cuff tendonitis  Begin eccentrically resisted motions, pylometrics (ex. Weighted ball toss),  proprioception (es. body blade)  Begin sports related rehab at 4 ½ months, including advanced conditioning  Return to throwing at 6 months  Throw from pitcher's mound at 9 months  Collision sports at 9 months  MMI is usually at 12 months post-op         I was present for the entire procedure.     Patient Disposition:  PACU         SIGNATURE: Ernesto Limon MD  DATE: January 15, 2025  TIME: 11:48 AM

## 2025-01-15 NOTE — ANESTHESIA PROCEDURE NOTES
Peripheral Block    Patient location during procedure: holding area  Start time: 1/15/2025 9:18 AM  Reason for block: at surgeon's request and post-op pain management  Staffing  Performed by: Parish Mcrae MD  Authorized by: Parish Mcrae MD    Preanesthetic Checklist  Completed: patient identified, IV checked, site marked, risks and benefits discussed, surgical consent, monitors and equipment checked, pre-op evaluation and timeout performed  Peripheral Block  Patient position: supine  Prep: ChloraPrep  Patient monitoring: frequent blood pressure checks, continuous pulse oximetry and heart rate  Block type: Interscalene  Laterality: left  Injection technique: single-shot  Procedures: ultrasound guided, Ultrasound guidance required for the procedure to increase accuracy and safety of medication placement and decrease risk of complications.  Ultrasound permanent image saved  bupivacaine (PF) (MARCAINE) 0.5 % injection 20 mL - Perineural   10 mL - 1/15/2025 9:18:00 AM  bupivacaine liposomal (EXPAREL) 1.3 % injection 20 mL - Perineural   20 mL - 1/15/2025 9:18:00 AM  midazolam (VERSED) injection 0.5 mg - Intravenous   2 mg - 1/15/2025 9:16:00 AM  Needle  Needle type: Stimuplex   Needle localization: anatomical landmarks and ultrasound guidance  Assessment  Injection assessment: incremental injection, frequent aspiration, injected with ease, negative aspiration, negative for heart rate change, no paresthesia on injection, no symptoms of intraneural/intravenous injection and needle tip visualized at all times  Paresthesia pain: none  Post-procedure:  site cleaned  patient tolerated the procedure well with no immediate complications

## 2025-01-15 NOTE — ANESTHESIA POSTPROCEDURE EVALUATION
Post-Op Assessment Note    CV Status:  Stable    Pain management: adequate       Mental Status:  Alert and awake   Hydration Status:  Euvolemic   PONV Controlled:  Controlled   Airway Patency:  Patent  Two or more mitigation strategies used for obstructive sleep apnea   Post Op Vitals Reviewed: Yes    No anethesia notable event occurred.    Staff: CRNA           Last Filed PACU Vitals:  Vitals Value Taken Time   Temp 97 °F (36.1 °C) 01/15/25 1158   Pulse 67 01/15/25 1206   /69 01/15/25 1200   Resp 9 01/15/25 1206   SpO2 97 % 01/15/25 1206   Vitals shown include unfiled device data.    Modified Rafale:     Vitals Value Taken Time   Activity 2 01/15/25 1158   Respiration 1 01/15/25 1158   Circulation 2 01/15/25 1158   Consciousness 1 01/15/25 1158   Oxygen Saturation 1 01/15/25 1158     Modified Rafael Score: 7

## 2025-01-15 NOTE — DISCHARGE INSTR - AVS FIRST PAGE
POSTOPERATIVE INSTRUCTIONS following ROTATOR CUFF REPAIR    MEDICATIONS:  Resume all home medications unless otherwise instructed by your surgeon.  Pain Medication:  Oxycodone 5 mg, 1 tablets every 4 hours as needed  If you were given a regional anesthetic (nerve block), please begin taking the pain medication as soon as you get home, even if you have minimal or no pain.  DO NOT WAIT FOR THE NERVE BLOCK TO WEAR OFF.  Possible side effects include nausea, constipation, and urinary retention.  If you experience these side effects, please call our office for assistance.  Pain med refills are authorized only during office hours (8am-4pm, Mon-Fri).  Anti-Inflammatory:  Naproxen 500 mg, 1 tablet every 12 hours for 4 weeks and Tylenol 325 mg, 1-2 tablets every 6 hours for 4 weeks  TAKE WITH FOOD.  Stop if you experience nausea, reflux, or stomach pain.    WOUND CARE:  Keep the dressing clean and dry.  Light drainage may occur the first 2 days postop.  You may remove the dressings and get the incision wet in the shower 72 hours after surgery.  Do not remove steri-strips or sutures.  Do not immerse the incision under water.  Carefully pat the incision dry.  If there is wound drainage, re-apply a fresh dry gauze dressing.  Please call our office ([phone]) if you experience either of the following:  Sudden increase in swelling, redness, or warmth at the surgical site  Excessive incisional drainage that persists beyond the 3rd day after surgery  Oral temperature greater than 101 degrees, not relieved with Tylenol  Shortness of breath, chest pain, nausea, or any other concerning symptoms    SWELLING CONTROL:  Cold Therapy:  The cold therapy device may be used either continuously or only as needed, according to your preference.  Do not let the pad directly touch your skin.  Alternatively, apply ice (20 min on, 20 min off) as often as you feel is necessary.    SLING:  Wear your sling at all times (including sleep) until your  first postoperative office visit.  You may remove the sling for showering but must keep your arm at your side.    ACTIVITY:   Do not lift, carry, push, or pull anything with your operative arm.  You are not allowed range of motion of your shoulder until you are given permission from your surgeon. You may do gentle range of motion of the elbow, wrist, and fingers.  Place a pillow behind the elbow while lying down.  Sleeping in a more upright position (recliner) may be more comfortable initially.  Wrist / Finger Motion:  With the sling on, move your wrist and fingers through a full range of motion 20 times per hour while awake.    PHYSICAL THERAPY:  You may begin physical therapy after you are seen in the office for your postoperative appointment. We will place a referral to physical therapy after the procedure so you can call in advance to get an appointment set up after the first postoperative appointment.    FOLLOW-UP APPOINTMENT:  10-14 days after surgery with:    Dr. Ernesto Limon MD  [facility]  [address] [place], [address] ([facility])  [address] [place], [address] [facility])  [phone]

## 2025-01-17 ENCOUNTER — TELEPHONE (OUTPATIENT)
Age: 60
End: 2025-01-17

## 2025-01-17 NOTE — TELEPHONE ENCOUNTER
Caller: Mikaela- girlfriend     Doctor: Ashly     Reason for call: He is feeling nauseous from the oxycodone and asked for suggestions      Call back#: [phone]

## 2025-02-11 ENCOUNTER — OFFICE VISIT (OUTPATIENT)
Age: 60
End: 2025-02-11

## 2025-02-11 VITALS — HEIGHT: 70 IN | BODY MASS INDEX: 34.92 KG/M2

## 2025-02-11 DIAGNOSIS — Z98.890 STATUS POST LEFT ROTATOR CUFF REPAIR: Primary | ICD-10-CM

## 2025-02-11 PROCEDURE — 99024 POSTOP FOLLOW-UP VISIT: CPT | Performed by: ORTHOPAEDIC SURGERY

## 2025-02-11 NOTE — PROGRESS NOTES
Subjective   CHIEF COMPLAINT/REASON FOR VISIT  Jackson Aquino is a 59 y.o. male who presents for  4 week post op follow-up after Arthroscopic repair of rotator cuff, subacromial decompression, biceps tenodesis - Left on 1/15/2025     HISTORY OF PRESENT ILLNESS  Overall, he feels things are going well and progressing appropriately.  Patient reports some mild soreness in the left shoulder.  He said his block lasted about 9 days for him.  He has been using the sling while at home.    Objective   PHYSICAL EXAMINATION  Left Shoulder  Surgical incisions well healed  ROM of shoulder not tested  ROM of elbow, wrist, and hand intact  Fingers warm and perfused  NVID     Assessment & Plan   1. Status Post Arthroscopic repair of rotator cuff, subacromial decompression, biceps tenodesis - Left    Patient is making appropriate progress from the date of their surgery  Educated on expected postoperative expectations  Recommend to wean out of the sling over the next 2 weeks  Reviewed surgical pictures with patient and wife  Encouraged to get set up with physical therapy to begin rehabilitation protocol.  PT order placed  Recommended RICE and anti-inflammatories as needed  We will plan to see him back at the  10 week post-operative brunilda  If any issues, questions, or concerns arise between now and the next appointment, we have encouraged the patient contact our team.    Scribe Attestation      I,:  Milton Lilly PA-C am acting as a scribe while in the presence of the attending physician.:       I,:  Ernesto Limon MD personally performed the services described in this documentation    as scribed in my presence.:

## 2025-02-11 NOTE — LETTER
February 11, 2025     Patient: Jackson Aquino  YOB: 1965  Date of Visit: 2/11/2025      To Whom it May Concern:    Jackson Aquino is under my professional care. Jackson was seen in my office on 2/11/2025. Jackson should be excused from work for the next 6 weeks as he recovers from orthopedic surgery.    If you have any questions or concerns, please don't hesitate to call.         Sincerely,          Ernesto Limon MD        CC: No Recipients

## 2025-03-26 ENCOUNTER — TELEPHONE (OUTPATIENT)
Age: 60
End: 2025-03-26

## 2025-03-26 NOTE — TELEPHONE ENCOUNTER
"Caller: Patient    Doctor: Dr. 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  He needs to have this done by Friday of this week.  Please advise once this has been completed.    Call back#: [phone]  "

## 2025-03-27 ENCOUNTER — TELEPHONE (OUTPATIENT)
Dept: OBGYN CLINIC | Facility: MEDICAL CENTER | Age: 60
End: 2025-03-27

## 2025-03-27 NOTE — TELEPHONE ENCOUNTER
Caller: Patient     Doctor: Dr. Limon    Reason for call: Patient called for update regarding DOT form, stated faxed to office this morning at 8:00am. Please call patient once form has been faxed to [facility].    Call back#: [phone]

## 2025-03-27 NOTE — TELEPHONE ENCOUNTER
Patient wants to know if you would fill this out-to get back to DOT in time.  If not it has been sent off the leave team and they will complete it there

## 2025-04-03 ENCOUNTER — OFFICE VISIT (OUTPATIENT)
Age: 60
End: 2025-04-03

## 2025-04-03 VITALS — WEIGHT: 250 LBS | HEIGHT: 70 IN | BODY MASS INDEX: 35.79 KG/M2

## 2025-04-03 DIAGNOSIS — Z98.890 STATUS POST LEFT ROTATOR CUFF REPAIR: Primary | ICD-10-CM

## 2025-04-03 PROCEDURE — 99024 POSTOP FOLLOW-UP VISIT: CPT | Performed by: ORTHOPAEDIC SURGERY

## 2025-04-03 NOTE — PROGRESS NOTES
Subjective   CHIEF COMPLAINT/REASON FOR VISIT  Jackson Aquino is a 60 y.o. male who presents for  11 week post op follow-up after Arthroscopic repair of rotator cuff, subacromial decompression, biceps tenodesis - Left on 1/15/2025     HISTORY OF PRESENT ILLNESS  Overall, he feels things are going well and progressing appropriately. Patient mentions he still has some pain with certain movement of the shoulder. He feels his motion is good but he lacks strength. He has been working with PT which has gone well.     Objective   PHYSICAL EXAMINATION  General:   Well-appearing  No acute distress  Appears stated age    left Shoulder  Skin is intact with no erythema, warmth or drainage. Surgical incisions well healed.  Shoulder effusion none present  Shoulder abduction 0-160 passive  Shoulder forward flexion  0-160 passive  Infraspinatus/ER 5/5   Subscapularis/IR 5/5   Motor strength intact distally  Sensation to light touch is normal in the axillary, radial, ulnar, and median nerve distributions.  Fingers warm and perfused     Assessment & Plan  Status post left rotator cuff repair            1. Status Post Arthroscopic repair of rotator cuff, subacromial decompression, biceps tenodesis - Left    Patient is making appropriate progress from the date of their surgery  Encouraged to work with physical therapy on rehab protocol  We will plan to see him back at the  6-9 month post-operative brunilda  If any issues, questions, or concerns arise between now and the next appointment, we have encouraged the patient contact our team.    Scribe Attestation      I,:  Milton Lilly PA-C am acting as a scribe while in the presence of the attending physician.:       I,:  Ernesto Limon MD personally performed the services described in this documentation    as scribed in my presence.:

## 2025-04-03 NOTE — LETTER
April 3, 2025     Patient: Jackson Aquino  YOB: 1965  Date of Visit: 4/3/2025      To Whom it May Concern:    Jackson Aquino is under my professional care. Jackson was seen in my office on 4/3/2025. Jackson should have be on light duty. Patient should avoid lifting heavier than 5 pounds with the left upper extremity and avoid overhead activity. These restrictions should last 4-6 weeks.    If you have any questions or concerns, please don't hesitate to call.         Sincerely,          Ernesto Limon MD        CC: No Recipients

## 2025-04-14 ENCOUNTER — TELEPHONE (OUTPATIENT)
Age: 60
End: 2025-04-14

## 2025-04-14 NOTE — TELEPHONE ENCOUNTER
Caller: patient    Doctor: Dr. Limon    Reason for call: patient was told to give a call once he feels ready to go back to work, patient feels he will be ready for 4/28. Please give the patient a call once note is put into chart so he can pick it up   Thank you     Call back#: [phone]

## 2025-04-15 NOTE — TELEPHONE ENCOUNTER
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 10-15 lbs

## 2025-05-15 ENCOUNTER — OFFICE VISIT (OUTPATIENT)
Age: 60
End: 2025-05-15
Payer: OTHER MISCELLANEOUS

## 2025-05-15 VITALS — WEIGHT: 251 LBS | BODY MASS INDEX: 35.93 KG/M2 | HEIGHT: 70 IN

## 2025-05-15 DIAGNOSIS — Z98.890 STATUS POST LEFT ROTATOR CUFF REPAIR: Primary | ICD-10-CM

## 2025-05-15 PROCEDURE — 99213 OFFICE O/P EST LOW 20 MIN: CPT | Performed by: ORTHOPAEDIC SURGERY

## 2025-05-15 NOTE — LETTER
May 15, 2025     Patient: Jackson Aquino  YOB: 1965    To Whom it May Concern:    Jackson Aquino is under my professional care. Jackson should avoid lifting heavier than 10 pounds and overhead activity. 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    If you have any questions or concerns, please don't hesitate to call.         Sincerely,          Ernesto Limon MD        CC: No Recipients

## 2025-05-15 NOTE — PROGRESS NOTES
Sports Medicine and Shoulder Surgery    Jackson Aquino, 60 y.o. male   MRN# [id number]   : 1965        Assessment & Plan  Status post left rotator cuff repair    Orders:  •  Ambulatory Referral to Physical Therapy; Future         Patient is making expected progress from the day of surgery  Educated on expected postoperative outcomes/expectations  Encouraged to work with physical therapy on rehabilitation protocol.  A new physical therapy prescription was placed during the visit  Recommend rest, ice, compression, and elevation to help with pain and swelling  We did discuss the possibility of releasing patient to full duty in 2 months which patient is agreeable with  Follow up: 6 month  If any issues, questions, or concerns arise between now and the next appointment, we have encouraged the patient contact our team.      Scribe Attestation      I,:  Milton Lilly PA-C am acting as a scribe while in the presence of the attending physician.:       I,:  Ernesto Limon MD personally performed the services described in this documentation    as scribed in my presence.:               Chief Complaint:    Jackson Aquino is a 60 y.o. male who presents for 4 month post op follow-up after Arthroscopic repair of rotator cuff, subacromial decompression, biceps tenodesis - Left on 1/15/2025     Subjective:   Patient reports that they are recovering as expected from their procedure. He feels his range of motion is improving.  Patient has been working with physical therapy three times a week which is going well. Patient denies any new orthopedic complaints at this time.      Physical Examination:  General:   Well-appearing  No acute distress  Appears stated age    left Shoulder  Skin is intact with no erythema, warmth or drainage. Surgical incisions well healed.  Negative tenderness to palpation over the acromioclavicular joint  Negative tenderness to palpation over the long head of the biceps tendon  Shoulder effusion  none present  Shoulder abduction 180/180  Shoulder forward flexion 180/180  Shoulder internal rotation T7/T7  Supraspinatus/ABD 4/5   Infraspinatus/ER 4/5   Subscapularis/IR 5/5   Negative Belly Press/SS  Negative Neer  Motor strength intact distally  Sensation to light touch is normal in the axillary, radial, ulnar, and median nerve distributions.  Fingers warm and perfused     Imaging Studies:  N/a    Review of Systems:  General- denies fever/chills  HEENT- denies hearing loss or sore throat  Eyes- denies eye pain or visual disturbances, denies red eyes  Respiratory- denies cough or SOB  Cardio- denies chest pain or palpitations  GI- denies abdominal pain  Endocrine- denies urinary frequency  Urinary- denies pain with urination  Musculoskeletal- Negative except noted above  Skin- denies rashes or wounds  Neurological- denies dizziness or headache  Psychiatric- denies anxiety or difficulty concentrating       Allergies:  Allergies   Allergen Reactions   • Morphine Vomiting       Medications:  Current Medications[1]    Past Medical History:  Past Medical History:   Diagnosis Date   • Anxiety    • Depression    • PONV (postoperative nausea and vomiting)         Past Surgical History:  Past Surgical History:   Procedure Laterality Date   • ARTHROSCOPIC REPAIR ACL Right    • CARPAL TUNNEL RELEASE Bilateral     r hand trigger finger   • KNEE ARTHROSCOPY Bilateral    • NC SURGICAL ARTHROSCOPY SHOULDER W/ROTATOR CUFF RPR Left 1/15/2025    Procedure: Arthroscopic repair of rotator cuff, subacromial decompression, biceps tenodesis;  Surgeon: Ernesto Limon MD;  Location: [facility] OR;  Service: Orthopedics   • REPLACEMENT TOTAL KNEE Bilateral         Family History:  History reviewed. No pertinent family history.     Social History:  Social History     Socioeconomic History   • Marital status: Single     Spouse name: Not on file   • Number of children: Not on file   • Years of education: Not on file   • Highest education  level: Not on file   Occupational History   • Not on file   Tobacco Use   • Smoking status: Every Day     Current packs/day: 1.00     Types: Cigarettes     Passive exposure: Current (did not smoke this AM)   • Smokeless tobacco: Never   Vaping Use   • Vaping status: Never Used   Substance and Sexual Activity   • Alcohol use: Not Currently   • Drug use: Not Currently   • Sexual activity: Not on file   Other Topics Concern   • Not on file   Social History Narrative   • Not on file     Social Drivers of Health     Financial Resource Strain: Not on file   Food Insecurity: Not on file   Transportation Needs: Not on file   Physical Activity: Not on file   Stress: Not on file   Social Connections: Not on file   Intimate Partner Violence: Not on file   Housing Stability: Not on file        Objective:   Body mass index is 36.01 kg/m².      ---------------------------------------------------------------------  Ernesto Limon MD, PhD   Orthopedic Surgery, [facility]   Sports Medicine and Shoulder Surgery                     [1]    Current Outpatient Medications:   •  acetaminophen (TYLENOL) 325 mg tablet, Take 1 tablet (325 mg total) by mouth every 6 (six) hours as needed for mild pain, Disp: 30 tablet, Rfl: 1  •  escitalopram (LEXAPRO) 10 mg tablet, Take 10 mg by mouth in the morning., Disp: , Rfl:   •  hydrOXYzine HCL (ATARAX) 10 mg tablet, Take 10 mg by mouth, Disp: , Rfl:   •  naloxone (NARCAN) 4 mg/0.1 mL nasal spray, Administer 1 spray into a nostril. If no response after 2-3 minutes, give another dose in the other nostril using a new spray., Disp: 1 each, Rfl: 1  •  naproxen (Naprosyn) 500 mg tablet, Take 1 tablet (500 mg total) by mouth 2 (two) times a day with meals, Disp: 60 tablet, Rfl: 1  •  oxyCODONE (Roxicodone) 5 immediate release tablet, Take 1 tablet (5 mg total) by mouth every 4 (four) hours as needed for moderate pain for up to 15 doses Max Daily Amount: 30 mg, Disp: 15 tablet,  Rfl: 0  •  traMADol (Ultram) 50 mg tablet, Take 1 tablet (50 mg total) by mouth every 6 (six) hours as needed for moderate pain (Patient not taking: Reported on 1/2/2025), Disp: 30 tablet, Rfl: 0

## 2025-07-02 ENCOUNTER — TELEPHONE (OUTPATIENT)
Dept: OBGYN CLINIC | Facility: HOSPITAL | Age: 60
End: 2025-07-02

## 2025-07-03 ENCOUNTER — OFFICE VISIT (OUTPATIENT)
Age: 60
End: 2025-07-03
Payer: OTHER MISCELLANEOUS

## 2025-07-03 VITALS — BODY MASS INDEX: 35.22 KG/M2 | HEIGHT: 70 IN | WEIGHT: 246 LBS

## 2025-07-03 DIAGNOSIS — Z47.89 AFTERCARE FOLLOWING SURGERY OF THE MUSCULOSKELETAL SYSTEM: ICD-10-CM

## 2025-07-03 DIAGNOSIS — Z98.890 STATUS POST LEFT ROTATOR CUFF REPAIR: Primary | ICD-10-CM

## 2025-07-03 PROCEDURE — 99213 OFFICE O/P EST LOW 20 MIN: CPT | Performed by: ORTHOPAEDIC SURGERY

## 2025-07-03 NOTE — ASSESSMENT & PLAN NOTE
Doing well s/p rotator cuff repair 1/15/25  Return to work note provided for 7/4/25 without restrictions  D/C from formal PT to HEP

## 2025-07-03 NOTE — PROGRESS NOTES
Sports Medicine and Shoulder Surgery    Jackson Aquino, 60 y.o. male   MRN# [id number]   : 1965          Subjective   CHIEF COMPLAINT/REASON FOR VISIT  Jackson Aquino is a 60 y.o. male who presents for nearly 6 months post op follow-up after Arthroscopic repair of rotator cuff, subacromial decompression, biceps tenodesis - Left on 1/15/2025     HISTORY OF PRESENT ILLNESS  Overall, he feels things are going well and progressing appropriately. Pain has been well controlled. He has been following the post-operative rehabilitation regimen without difficultly and feels as if he has hit a plateau at PT . Currently, he has not returned to work but is requesting to return to work.  He has mild discomfort with cross-chest ADD.  He feels as if he is able to return to work.     Objective   PHYSICAL EXAMINATION  Left  Shoulder    no erythema, warmth or drainage. Surgical incisions well healed.  Negative tenderness to palpation over the acromioclavicular joint  Negative tenderness to palpation over the long head of the biceps tendon  Shoulder effusion none present  Shoulder abduction 180/180  Shoulder forward flexion 180/180  Shoulder internal rotation T7/T7  Supraspinatus/ABD 4/5   Infraspinatus/ER 4/5   Subscapularis/IR 5/5   Negative Belly Press/SS  Negative Neer  Motor strength intact distally  Sensation to light touch is normal in the axillary, radial, ulnar, and median nerve distributions.  Fingers warm and perfused       Assessment & Plan  Status post left rotator cuff repair  Aftercare following surgery of the musculoskeletal system  Doing well s/p rotator cuff repair 1/15/25  Return to work note provided for 25 without restrictions  D/C from formal PT to HEP          1. Status Post Arthroscopic repair of rotator cuff, subacromial decompression, biceps tenodesis - Left    Patient is making appropriate progress from the date of their surgery  Return to work note provided for 25 without restrictions  D/C  from formal PT to HEP  We will plan to see him back in 3-6 months if needed  If any issues, questions, or concerns arise between now and the next appointment, we have encouraged the patient contact our team.        Scribe Attestation      I,:  Mak Newton am acting as a scribe while in the presence of the attending physician.:       I,:  Ernesto Limon MD personally performed the services described in this documentation    as scribed in my presence.:

## 2025-07-03 NOTE — PATIENT INSTRUCTIONS
1. Status post left rotator cuff repair        2. Aftercare following surgery of the musculoskeletal system          Return to work note provided for 7/4/2025 without restrictions    Return for re-check 3-6 months.

## 2025-07-03 NOTE — LETTER
July 3, 2025     Patient: Jackson Aquino  YOB: 1965  Date of Visit: 7/3/2025      To Whom it May Concern:    Jackson Aquino is under my professional care. Jackson was seen in my office on 7/3/2025. aJckson is able to return to work without restrictions as of: 7/4/2025.    If you have any questions or concerns, please don't hesitate to call.         Sincerely,          Ernesto Limon MD        CC: No Recipients

## 2025-07-22 ENCOUNTER — TELEPHONE (OUTPATIENT)
Age: 60
End: 2025-07-22

## 2025-07-22 NOTE — TELEPHONE ENCOUNTER
Caller: [facility]     Doctor: Dr. Limon     Reason for call: Gail has been trying to fax over patient's plan of care for over a month now and is asking if it can please be completed and faxed back to their office please reach out to her with any questions     Call back#: [phone]

## (undated) DEVICE — 3M™ STERI-STRIP™ REINFORCED ADHESIVE SKIN CLOSURES, R1547, 1/2 IN X 4 IN (12 MM X 100 MM), 6 STRIPS/ENVELOPE: Brand: 3M™ STERI-STRIP™

## (undated) DEVICE — IMMOBILIZER SHOULDER QUICK FIT SLING W/PILLOW

## (undated) DEVICE — PUDDLE VAC

## (undated) DEVICE — BAG POLY CLEAR 12 X 8 X 30

## (undated) DEVICE — SUT MONOCRYL 3-0 PS-2 27 IN Y427H

## (undated) DEVICE — GLOVE SRG BIOGEL 7.5

## (undated) DEVICE — TUBING ARTHROSCOPIC WAVE  MAIN PUMP

## (undated) DEVICE — NEEDLE SUT SCORPION MEGALOADER

## (undated) DEVICE — PACK PBDS SHOULDER ARTHROSCOPY RF

## (undated) DEVICE — POSITIONER TRIMANO LIMB BEACH CHAIR

## (undated) DEVICE — INTENDED FOR TISSUE SEPARATION, AND OTHER PROCEDURES THAT REQUIRE A SHARP SURGICAL BLADE TO PUNCTURE OR CUT.: Brand: BARD-PARKER ® CARBON RIB-BACK BLADES

## (undated) DEVICE — T-MAX DISPOSABLE FACE MASK 8 PER BOX

## (undated) DEVICE — TUBING SUCTION 5MM X 12 FT

## (undated) DEVICE — CANNULA 7 X70MM THRD SEAL SIDE PORT

## (undated) DEVICE — PREP SURGICAL PURPREP 26ML

## (undated) DEVICE — KERLIX BANDAGE ROLL: Brand: KERLIX

## (undated) DEVICE — PROBE ABLATION  APOLLO RF 90 DEG MULTI PORT

## (undated) DEVICE — 3M™ STERI-DRAPE™ U-DRAPE 1015: Brand: STERI-DRAPE™

## (undated) DEVICE — SPONGE SCRUB 4 PCT CHLORHEXIDINE

## (undated) DEVICE — BLADE SHAVER DISSECTOR 5MM 13CM COOLCUT

## (undated) DEVICE — GLOVE INDICATOR PI UNDERGLOVE SZ 8 BLUE

## (undated) DEVICE — MAT ABSORBANT ARTHROSCOPY FLOOR 46 X 40 IN

## (undated) DEVICE — OCCLUSIVE GAUZE STRIP,3% BISMUTH TRIBROMOPHENATE IN PETROLATUM BLEND: Brand: XEROFORM